# Patient Record
Sex: FEMALE | Race: WHITE | Employment: OTHER | URBAN - METROPOLITAN AREA
[De-identification: names, ages, dates, MRNs, and addresses within clinical notes are randomized per-mention and may not be internally consistent; named-entity substitution may affect disease eponyms.]

---

## 2017-01-03 ENCOUNTER — HOSPITAL ENCOUNTER (OUTPATIENT)
Dept: LAB | Facility: HOSPITAL | Age: 82
Discharge: HOME | End: 2017-01-03
Attending: NURSE PRACTITIONER

## 2017-01-03 ENCOUNTER — HOSPITAL ENCOUNTER (OUTPATIENT)
Dept: CARDIOLOGY CLINIC | Facility: HOSPITAL | Age: 82
Discharge: HOME | End: 2017-01-03
Attending: NURSE PRACTITIONER

## 2017-01-03 ENCOUNTER — HOSPITAL ENCOUNTER (OUTPATIENT)
Dept: CARDIOLOGY CLINIC | Facility: HOSPITAL | Age: 82
End: 2017-01-03

## 2017-01-03 VITALS
SYSTOLIC BLOOD PRESSURE: 157 MMHG | OXYGEN SATURATION: 95 % | HEART RATE: 74 BPM | DIASTOLIC BLOOD PRESSURE: 64 MMHG | BODY MASS INDEX: 37 KG/M2 | WEIGHT: 244.19 LBS | RESPIRATION RATE: 18 BRPM

## 2017-01-03 DIAGNOSIS — I50.9 CHF (CONGESTIVE HEART FAILURE) (HCC): ICD-10-CM

## 2017-01-03 LAB
BASOPHILS # BLD AUTO: 0.05 X10(3) UL (ref 0–0.1)
BASOPHILS NFR BLD AUTO: 0.5 %
BUN BLD-MCNC: 18 MG/DL (ref 8–20)
CALCIUM BLD-MCNC: 8.7 MG/DL (ref 8.3–10.3)
CHLORIDE: 107 MMOL/L (ref 101–111)
CO2: 26 MMOL/L (ref 22–32)
CREAT BLD-MCNC: 1.16 MG/DL (ref 0.55–1.02)
EOSINOPHIL # BLD AUTO: 0.14 X10(3) UL (ref 0–0.3)
EOSINOPHIL NFR BLD AUTO: 1.4 %
ERYTHROCYTE [DISTWIDTH] IN BLOOD BY AUTOMATED COUNT: 15.1 % (ref 11.5–16)
GLUCOSE BLD-MCNC: 92 MG/DL (ref 70–99)
HCT VFR BLD AUTO: 43.9 % (ref 34–50)
HGB BLD-MCNC: 14.3 G/DL (ref 12–16)
IMMATURE GRANULOCYTE COUNT: 0.03 X10(3) UL (ref 0–1)
IMMATURE GRANULOCYTE RATIO %: 0.3 %
LYMPHOCYTES # BLD AUTO: 2.3 X10(3) UL (ref 0.9–4)
LYMPHOCYTES NFR BLD AUTO: 23.2 %
MCH RBC QN AUTO: 28.9 PG (ref 27–33.2)
MCHC RBC AUTO-ENTMCNC: 32.6 G/DL (ref 31–37)
MCV RBC AUTO: 88.7 FL (ref 81–100)
MONOCYTES # BLD AUTO: 0.7 X10(3) UL (ref 0.1–0.6)
MONOCYTES NFR BLD AUTO: 7.1 %
NEUTROPHIL ABS PRELIM: 6.69 X10 (3) UL (ref 1.3–6.7)
NEUTROPHILS # BLD AUTO: 6.69 X10(3) UL (ref 1.3–6.7)
NEUTROPHILS NFR BLD AUTO: 67.5 %
PLATELET # BLD AUTO: 201 10(3)UL (ref 150–450)
POTASSIUM SERPL-SCNC: 4.5 MMOL/L (ref 3.6–5.1)
RBC # BLD AUTO: 4.95 X10(6)UL (ref 3.8–5.1)
RED CELL DISTRIBUTION WIDTH-SD: 49 FL (ref 35.1–46.3)
SODIUM SERPL-SCNC: 140 MMOL/L (ref 136–144)
WBC # BLD AUTO: 9.9 X10(3) UL (ref 4–13)

## 2017-01-03 PROCEDURE — 99214 OFFICE O/P EST MOD 30 MIN: CPT

## 2017-01-03 PROCEDURE — 80048 BASIC METABOLIC PNL TOTAL CA: CPT | Performed by: NURSE PRACTITIONER

## 2017-01-03 PROCEDURE — 85025 COMPLETE CBC W/AUTO DIFF WBC: CPT | Performed by: NURSE PRACTITIONER

## 2017-01-03 PROCEDURE — 36415 COLL VENOUS BLD VENIPUNCTURE: CPT | Performed by: NURSE PRACTITIONER

## 2017-01-03 RX ORDER — ISOSORBIDE MONONITRATE 30 MG/1
60 TABLET, EXTENDED RELEASE ORAL 2 TIMES DAILY
Qty: 360 TABLET | Refills: 3 | Status: SHIPPED | OUTPATIENT
Start: 2017-01-03 | End: 2017-03-03

## 2017-01-03 NOTE — PROGRESS NOTES
[facility] Cardiac Health Progress Note    Jada Mercedes is a 80year old female who presents to clinic for APN assessment and management of chronic systolic heart failure and is functional class 2-3.      Subjective:  She returns for rou x10(3) uL 0.03   Neutrophils % Latest Units: % 67.5   Lymphocytes % Latest Units: % 23.2   Monocytes % Latest Units: % 7.1   Eosinophils % Latest Units: % 1.4   Basophils % Latest Units: % 0.5   Immature Granulocyte % Latest Units: % 0.3   Results for JOSE (ALDACTONE) 25 MG Oral Tab, Take 0.5 tablets by mouth daily. , Disp: 30 tablet, Rfl: 5  •  aspirin  MG Oral Tab EC, Take 1 tablet by mouth daily. , Disp: 30 tablet, Rfl: 0    Exam:   General:         Alert, in no apparent distress  HEENT:          No J

## 2017-01-04 ENCOUNTER — TELEPHONE (OUTPATIENT)
Dept: FAMILY MEDICINE CLINIC | Facility: CLINIC | Age: 82
End: 2017-01-04

## 2017-01-04 NOTE — TELEPHONE ENCOUNTER
Requesting Isosorbide Mono ER 60mg  LOV: 9/29/16  RTC: 3-6 months  Last Labs: 1/3/17  Filled: 1/3/16 #360 with 3 refills  At 30mg 2 bid dosing    Future Appointments  Date Time Provider Caitlin Astorga   2/3/2017 2:00 PM HEART FAILURE APN 1  HF CLIN Ed

## 2017-01-04 NOTE — TELEPHONE ENCOUNTER
Pharmacist called and stated that they never rec'd script & would like to know if it can be resent.    Isosorbide Mononitrate ER 30 MG Oral Tablet 24 Hr 360 tablet 3 1/3/2017      Sig :  Take 2 tablets (60 mg total) by mouth 2 (two) times daily.       Route

## 2017-01-04 NOTE — TELEPHONE ENCOUNTER
This was not prescribed by our office:     Patient Information      Patient Name MRN Sex      Magali Sons [id number] Female 10/29/1934       Order Information      Ordered Status Priority Ordering User Department     17 Sent (none) VERNON De Luna

## 2017-01-04 NOTE — TELEPHONE ENCOUNTER
Valarie Gray was notified and will have patient see Jose Monet or yg if persists. She said her mother is going to be moved from that facility to another and she has paperwork she needs filled out. She is dropping off tomorrow. Patient is moving in on 1/14/17.  MARTINE randhawa

## 2017-01-04 NOTE — TELEPHONE ENCOUNTER
Ok to call patient's dtr lorna mccann  That her urine test from her assisted living [place] is normal  No urine infection.     So just notify and can notify on 12/30 they called saying burning with urination is not from infection- might be from someth

## 2017-01-05 ENCOUNTER — TELEPHONE (OUTPATIENT)
Dept: FAMILY MEDICINE CLINIC | Facility: CLINIC | Age: 82
End: 2017-01-05

## 2017-01-05 RX ORDER — ISOSORBIDE MONONITRATE 60 MG/1
TABLET, EXTENDED RELEASE ORAL
Qty: 180 TABLET | Refills: 3 | OUTPATIENT
Start: 2017-01-05 | End: 2017-04-17

## 2017-01-05 NOTE — TELEPHONE ENCOUNTER
Pt daughter is dropping of paperwork for her mother for assisting living. Pt filled out questionnaire for disability and FMLA forms for  In questions. Paperwork put in triage red folder.  Thank you

## 2017-01-12 NOTE — TELEPHONE ENCOUNTER
LM for daughter Aaron Perez that paperwork is filled out. Does she want to pick it up or is there a fax number that we should fax it to? Most recent OV notes and ED discharge summary report are also included. All papers are in red triage folder.   Copies alrea

## 2017-01-12 NOTE — TELEPHONE ENCOUNTER
Assisted living paperwork filled out. Placed in MD bin for review and/or signature. Please call daughter Omar Holloway when completed.

## 2017-01-16 NOTE — TELEPHONE ENCOUNTER
Per Ayden Tyler, PSR  Daughter Gonzalo Larson called back and states that they no longer need the Assisted Living paperwork because patient moved back home and they will look into home care.

## 2017-02-03 ENCOUNTER — HOSPITAL ENCOUNTER (OUTPATIENT)
Dept: CARDIOLOGY CLINIC | Facility: HOSPITAL | Age: 82
Discharge: HOME | End: 2017-02-03
Attending: NURSE PRACTITIONER
Payer: MEDICARE

## 2017-02-03 ENCOUNTER — HOSPITAL ENCOUNTER (OUTPATIENT)
Dept: LAB | Facility: HOSPITAL | Age: 82
Discharge: HOME | End: 2017-02-03
Attending: NURSE PRACTITIONER
Payer: MEDICARE

## 2017-02-03 VITALS
BODY MASS INDEX: 37 KG/M2 | WEIGHT: 240.13 LBS | RESPIRATION RATE: 18 BRPM | DIASTOLIC BLOOD PRESSURE: 66 MMHG | OXYGEN SATURATION: 99 % | SYSTOLIC BLOOD PRESSURE: 150 MMHG | HEART RATE: 70 BPM

## 2017-02-03 PROCEDURE — 99214 OFFICE O/P EST MOD 30 MIN: CPT | Performed by: NURSE PRACTITIONER

## 2017-02-03 PROCEDURE — 36415 COLL VENOUS BLD VENIPUNCTURE: CPT | Performed by: NURSE PRACTITIONER

## 2017-02-03 PROCEDURE — 80048 BASIC METABOLIC PNL TOTAL CA: CPT | Performed by: NURSE PRACTITIONER

## 2017-02-03 NOTE — PROGRESS NOTES
[facility] for Cardiac Health Progress Note    Peter Soraes is a 80year old female who presents to clinic for APN assessment and management of chronic systolic heart failure and is functional class 2-3.      Subjective:  She returns for rou 90 capsule, Rfl: 1  •  Potassium Chloride ER 20 MEQ Oral Tab CR, Take 1 tablet (20 mEq total) by mouth daily. , Disp: 180 tablet, Rfl: 3  •  carvedilol 12.5 MG Oral Tab, Take 12.5 mg by mouth 2 (two) times daily with meals. , Disp: , Rfl:   •  lisinopril 40 hemodynamically stable.    4. Essential HTN - home SBPs 110s-130s. Well controlled. SBPs in clinic slightly elevated in the 140s-150s. Instructed to bring in home BP cuff at next visit  5. CAROLINE - using BiPAP consistently  6.  Hx of Arthritis - pain is contro

## 2017-02-09 ENCOUNTER — TELEPHONE (OUTPATIENT)
Dept: FAMILY MEDICINE CLINIC | Facility: CLINIC | Age: 82
End: 2017-02-09

## 2017-03-03 ENCOUNTER — HOSPITAL ENCOUNTER (OUTPATIENT)
Dept: CARDIOLOGY CLINIC | Facility: HOSPITAL | Age: 82
Discharge: HOME | End: 2017-03-03
Attending: NURSE PRACTITIONER
Payer: MEDICARE

## 2017-03-03 ENCOUNTER — HOSPITAL ENCOUNTER (OUTPATIENT)
Dept: LAB | Facility: HOSPITAL | Age: 82
Discharge: HOME | End: 2017-03-03
Attending: NURSE PRACTITIONER
Payer: MEDICARE

## 2017-03-03 VITALS
OXYGEN SATURATION: 93 % | RESPIRATION RATE: 18 BRPM | HEART RATE: 65 BPM | BODY MASS INDEX: 37 KG/M2 | SYSTOLIC BLOOD PRESSURE: 130 MMHG | DIASTOLIC BLOOD PRESSURE: 62 MMHG | WEIGHT: 242.38 LBS

## 2017-03-03 DIAGNOSIS — I50.9 CHF (CONGESTIVE HEART FAILURE) (HCC): ICD-10-CM

## 2017-03-03 LAB
BUN BLD-MCNC: 18 MG/DL (ref 8–20)
CALCIUM BLD-MCNC: 9.3 MG/DL (ref 8.3–10.3)
CHLORIDE: 109 MMOL/L (ref 101–111)
CO2: 25 MMOL/L (ref 22–32)
CREAT BLD-MCNC: 1.03 MG/DL (ref 0.55–1.02)
GLUCOSE BLD-MCNC: 92 MG/DL (ref 70–99)
POTASSIUM SERPL-SCNC: 4.4 MMOL/L (ref 3.6–5.1)
SODIUM SERPL-SCNC: 140 MMOL/L (ref 136–144)

## 2017-03-03 PROCEDURE — 36415 COLL VENOUS BLD VENIPUNCTURE: CPT | Performed by: NURSE PRACTITIONER

## 2017-03-03 PROCEDURE — 80048 BASIC METABOLIC PNL TOTAL CA: CPT | Performed by: NURSE PRACTITIONER

## 2017-03-03 PROCEDURE — 99213 OFFICE O/P EST LOW 20 MIN: CPT

## 2017-03-03 NOTE — PROGRESS NOTES
[facility] for Cardiac Health Progress Note    Judi Loo is a 80year old female who presents to clinic for APN assessment and management of chronic systolic heart failure and is functional class 3.      Subjective:  She returns for [facility] Rfl:   •  Lovastatin 20 MG Oral Tab, Take 20 mg by mouth nightly., Disp: , Rfl:   •  TraMADol HCl 50 MG Oral Tab, Take 50 mg by mouth every 6 (six) hours as needed for Pain., Disp: , Rfl:   •  nitroGLYCERIN (NITROSTAT) 0.4 MG Sublingual SL Tab, Place 1 tab - discussed portion control  8. Depression - has followed with Dr Skye Mike  9. 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 GFR of 51 today.     Plan:   · Continue current medications  · Return to clinic in

## 2017-03-21 ENCOUNTER — TELEPHONE (OUTPATIENT)
Dept: FAMILY MEDICINE CLINIC | Facility: CLINIC | Age: 82
End: 2017-03-21

## 2017-04-04 RX ORDER — LOVASTATIN 20 MG/1
20 TABLET ORAL NIGHTLY
Refills: 0 | OUTPATIENT
Start: 2017-04-04

## 2017-04-04 RX ORDER — CARVEDILOL 12.5 MG/1
TABLET ORAL
Qty: 180 TABLET | Refills: 0 | OUTPATIENT
Start: 2017-04-04

## 2017-04-04 RX ORDER — CARVEDILOL 12.5 MG/1
12.5 TABLET ORAL 2 TIMES DAILY WITH MEALS
Qty: 180 TABLET | Refills: 0 | Status: SHIPPED | OUTPATIENT
Start: 2017-04-04 | End: 2017-06-21

## 2017-04-04 RX ORDER — SPIRONOLACTONE 25 MG/1
12.5 TABLET ORAL DAILY
Qty: 30 TABLET | Refills: 5 | OUTPATIENT
Start: 2017-04-04

## 2017-04-04 RX ORDER — SPIRONOLACTONE 25 MG/1
TABLET ORAL
Refills: 0 | OUTPATIENT
Start: 2017-04-04

## 2017-04-04 RX ORDER — LOVASTATIN 20 MG/1
TABLET ORAL
Refills: 0 | OUTPATIENT
Start: 2017-04-04

## 2017-04-04 NOTE — TELEPHONE ENCOUNTER
Cholesterol Medication Protocol Failed4/3 2:58 PM   Lipid panel within past 6 months     Requesting lovastatin, spironolactone and carvedilol   LOV: 9/2016  RTC: 6m   Last Labs: 8/2016  Filled: Lovastatin last filled by heart failure clinic   Spironolacton

## 2017-04-05 PROBLEM — E66.01 SEVERE OBESITY (BMI 35.0-39.9) WITH COMORBIDITY (HCC): Chronic | Status: ACTIVE | Noted: 2017-04-05

## 2017-04-05 RX ORDER — SPIRONOLACTONE 25 MG/1
TABLET ORAL
Qty: 45 TABLET | Refills: 1 | Status: SHIPPED | OUTPATIENT
Start: 2017-04-05 | End: 2017-09-15

## 2017-04-05 RX ORDER — LOVASTATIN 20 MG/1
TABLET ORAL
Qty: 90 TABLET | Refills: 1 | OUTPATIENT
Start: 2017-04-05

## 2017-04-05 NOTE — TELEPHONE ENCOUNTER
[facility] is calling to check on the status of the refill request for SPIRONOLACTONE 25 MG Oral Tab and     LOVASTATIN 20 MG Oral Tab Pharmacy stated that they never received the request and that the patient is out of her medication.  Please call p

## 2017-04-05 NOTE — TELEPHONE ENCOUNTER
Cholesterol Medication Protocol Failed4/5 9:20 AM   Lipid panel within past 6 months        Requesting Lovastatin 20mg   LOV: 09/29/16  RTC: 3-6 mos  Last Labs: 01/03/17 bmp, cbc  Filled: 7/25/16#90 with 3 refills- has not been prescribed by Dr Samantha Kumar last fi

## 2017-04-17 ENCOUNTER — HOSPITAL ENCOUNTER (OUTPATIENT)
Dept: LAB | Facility: HOSPITAL | Age: 82
Discharge: HOME | End: 2017-04-17
Attending: NURSE PRACTITIONER
Payer: MEDICARE

## 2017-04-17 ENCOUNTER — HOSPITAL ENCOUNTER (OUTPATIENT)
Dept: CARDIOLOGY CLINIC | Facility: HOSPITAL | Age: 82
Discharge: HOME | End: 2017-04-17
Attending: NURSE PRACTITIONER
Payer: MEDICARE

## 2017-04-17 VITALS
OXYGEN SATURATION: 98 % | WEIGHT: 245.31 LBS | SYSTOLIC BLOOD PRESSURE: 116 MMHG | RESPIRATION RATE: 18 BRPM | BODY MASS INDEX: 37 KG/M2 | DIASTOLIC BLOOD PRESSURE: 59 MMHG | HEART RATE: 64 BPM

## 2017-04-17 DIAGNOSIS — I50.22 CHRONIC SYSTOLIC CONGESTIVE HEART FAILURE (HCC): ICD-10-CM

## 2017-04-17 PROCEDURE — 80048 BASIC METABOLIC PNL TOTAL CA: CPT | Performed by: NURSE PRACTITIONER

## 2017-04-17 PROCEDURE — 99214 OFFICE O/P EST MOD 30 MIN: CPT

## 2017-04-17 PROCEDURE — 85027 COMPLETE CBC AUTOMATED: CPT | Performed by: NURSE PRACTITIONER

## 2017-04-17 PROCEDURE — 36415 COLL VENOUS BLD VENIPUNCTURE: CPT | Performed by: NURSE PRACTITIONER

## 2017-04-17 RX ORDER — ISOSORBIDE MONONITRATE 60 MG/1
TABLET, EXTENDED RELEASE ORAL
Qty: 90 TABLET | Refills: 6 | Status: SHIPPED | OUTPATIENT
Start: 2017-04-17

## 2017-04-17 RX ORDER — ISOSORBIDE MONONITRATE 60 MG/1
TABLET, EXTENDED RELEASE ORAL
Status: SHIPPED | COMMUNITY
Start: 2017-04-17 | End: 2017-04-17

## 2017-04-17 NOTE — PROGRESS NOTES
RN contacted pt to inform her of her appointment that was scheduled with Dr. Job Asencio this Friday (4/21) at 12:30pm. Pt will attend the appointment and will call the [facility] office if she needs to cancel.

## 2017-04-17 NOTE — ADDENDUM NOTE
Encounter addended by: OLGA Vance on: 4/17/2017 10:45 AM<BR>     Documentation filed: Medications, Orders

## 2017-04-17 NOTE — PROGRESS NOTES
[facility] for Cardiac Health Progress Note    Norma Wheeler is a 80year old female who presents to clinic for APN assessment and management of chronic systolic heart failure and is functional class 3.      Subjective:  She returns for [facility] Take 1 tablet (20 mEq total) by mouth daily. , Disp: 180 tablet, Rfl: 3  •  lisinopril 40 MG Oral Tab, Take 40 mg by mouth daily. , Disp: , Rfl:   •  Lovastatin 20 MG Oral Tab, Take 20 mg by mouth nightly., Disp: , Rfl:   •  TraMADol HCl 50 MG Oral Tab, Take increased frequency of chest pain. · Return to clinic in 4-6 weeks  · F/U with Dr. Gene Hernandez asap.   · CHF discharge instructions given    I spent greater than 45 minutes with this patient providing counseling, coordination of care and education related s

## 2017-04-17 NOTE — ADDENDUM NOTE
Encounter addended by: Malia Medina RN on: 4/17/2017 11:38 AM<BR>     Documentation filed: Clinical Notes

## 2017-04-21 ENCOUNTER — PRIOR ORIGINAL RECORDS (OUTPATIENT)
Dept: OTHER | Age: 82
End: 2017-04-21

## 2017-04-26 ENCOUNTER — TELEPHONE (OUTPATIENT)
Dept: FAMILY MEDICINE CLINIC | Facility: CLINIC | Age: 82
End: 2017-04-26

## 2017-04-26 PROBLEM — M47.812 CERVICAL ARTHRITIS: Status: ACTIVE | Noted: 2017-04-26

## 2017-04-26 RX ORDER — LOVASTATIN 20 MG/1
20 TABLET ORAL NIGHTLY
Qty: 90 TABLET | Refills: 1 | Status: SHIPPED | OUTPATIENT
Start: 2017-04-26 | End: 2017-10-09

## 2017-04-26 RX ORDER — OMEPRAZOLE 20 MG/1
20 CAPSULE, DELAYED RELEASE ORAL DAILY
Qty: 90 CAPSULE | Refills: 1 | Status: SHIPPED | OUTPATIENT
Start: 2017-04-26 | End: 2017-10-09

## 2017-04-26 NOTE — TELEPHONE ENCOUNTER
omeprazole 20 MG Oral Capsule Delayed Release  Gastrointestinal Medication Protocol Failed4/26 4:35 PM   Appointment in past 6 or next 1 month   Lovastatin 20 MG Oral Tab  Cholesterol Medication Protocol Failed4/26 4:35 PM   Lipid panel within past 6 month

## 2017-05-12 RX ORDER — CELECOXIB 200 MG/1
CAPSULE ORAL
Qty: 30 CAPSULE | Refills: 1 | Status: SHIPPED | OUTPATIENT
Start: 2017-05-12 | End: 2017-07-11

## 2017-05-12 NOTE — TELEPHONE ENCOUNTER
Katy john called and stated that she is requesting a refill on her prescription for celebrex. Patient is having a lot of hip pain and is having a hard time sleeping at night. Patient has an appointment with Dr. Cortes Telles on 05/26/2017.    Daughter states

## 2017-05-22 ENCOUNTER — HOSPITAL ENCOUNTER (OUTPATIENT)
Dept: CARDIOLOGY CLINIC | Facility: HOSPITAL | Age: 82
End: 2017-05-22
Attending: NURSE PRACTITIONER
Payer: MEDICARE

## 2017-05-25 RX ORDER — LISINOPRIL 40 MG/1
TABLET ORAL
Qty: 90 TABLET | Refills: 0 | Status: SHIPPED | OUTPATIENT
Start: 2017-05-25 | End: 2017-07-19

## 2017-05-25 RX ORDER — POTASSIUM CHLORIDE 20 MEQ/1
TABLET, EXTENDED RELEASE ORAL
Qty: 40 TABLET | Refills: 0 | Status: SHIPPED | OUTPATIENT
Start: 2017-05-25 | End: 2017-06-21

## 2017-05-25 RX ORDER — FUROSEMIDE 20 MG/1
TABLET ORAL
Qty: 20 TABLET | Refills: 0 | Status: SHIPPED | OUTPATIENT
Start: 2017-05-25 | End: 2017-06-21

## 2017-05-25 NOTE — TELEPHONE ENCOUNTER
Requesting Potassium Chloride, Furosemide, Lisinopril  LOV: 9/29/16  RTC: 3-6 months  Last Labs: 4/17/17  Filled: 9/29/15 #180 with 3 refills   Furosemide - not filled by us.  Given as inpatient 2016  Filled: 11/21/16 #90 with 1 refill     Lisinopril  Fille

## 2017-06-08 ENCOUNTER — HOSPITAL ENCOUNTER (OUTPATIENT)
Dept: LAB | Facility: HOSPITAL | Age: 82
Discharge: HOME | End: 2017-06-08
Attending: NURSE PRACTITIONER
Payer: MEDICARE

## 2017-06-08 ENCOUNTER — HOSPITAL ENCOUNTER (OUTPATIENT)
Dept: CARDIOLOGY CLINIC | Facility: HOSPITAL | Age: 82
Discharge: HOME | End: 2017-06-08
Attending: NURSE PRACTITIONER
Payer: MEDICARE

## 2017-06-08 VITALS
HEART RATE: 69 BPM | BODY MASS INDEX: 37 KG/M2 | DIASTOLIC BLOOD PRESSURE: 58 MMHG | RESPIRATION RATE: 18 BRPM | SYSTOLIC BLOOD PRESSURE: 102 MMHG | OXYGEN SATURATION: 97 % | WEIGHT: 244.63 LBS

## 2017-06-08 DIAGNOSIS — I50.22 CHRONIC SYSTOLIC CONGESTIVE HEART FAILURE (HCC): ICD-10-CM

## 2017-06-08 PROCEDURE — 36415 COLL VENOUS BLD VENIPUNCTURE: CPT | Performed by: NURSE PRACTITIONER

## 2017-06-08 PROCEDURE — 99214 OFFICE O/P EST MOD 30 MIN: CPT

## 2017-06-08 PROCEDURE — 80048 BASIC METABOLIC PNL TOTAL CA: CPT | Performed by: NURSE PRACTITIONER

## 2017-06-08 NOTE — PROGRESS NOTES
[facility] for Cardiac Health Progress Note    Jovana Rodriguez is a 80year old female who presents to clinic for APN assessment and management of chronic systolic heart failure and is functional class 3.      Subjective:  She returns for [facility] by mouth daily. , Disp: , Rfl:   •  TraMADol HCl 50 MG Oral Tab, Take 50 mg by mouth every 6 (six) hours as needed for Pain., Disp: , Rfl:   •  nitroGLYCERIN (NITROSTAT) 0.4 MG Sublingual SL Tab, Place 1 tablet (0.4 mg total) under the tongue every 5 (five) providing counseling, coordination of care and education related specifically to heart failure.     Cuate ESPINOZA  6/8/2017

## 2017-06-22 RX ORDER — FUROSEMIDE 20 MG/1
TABLET ORAL
Qty: 20 TABLET | Refills: 0 | Status: SHIPPED | OUTPATIENT
Start: 2017-06-22 | End: 2017-07-19

## 2017-06-22 RX ORDER — POTASSIUM CHLORIDE 20 MEQ/1
TABLET, EXTENDED RELEASE ORAL
Qty: 40 TABLET | Refills: 0 | Status: SHIPPED | OUTPATIENT
Start: 2017-06-22 | End: 2017-07-19

## 2017-06-22 RX ORDER — CARVEDILOL 12.5 MG/1
TABLET ORAL
Qty: 180 TABLET | Refills: 1 | Status: SHIPPED | OUTPATIENT
Start: 2017-06-22 | End: 2017-12-05

## 2017-06-22 NOTE — TELEPHONE ENCOUNTER
Requesting Furosemide, Potassium, Carvedilol  LOV: 9/29/16  RTC: 3-6 months  Last Labs: 6/8/17  Filled: 4/4/17 #180 with 0 refills  Carvedilol  Filled: 5/25/17 #40 with 0 refills Potassium  Filled: 5/25/17 #20 with 0 refills Furosemide    Future Appointmen

## 2017-06-29 ENCOUNTER — OFFICE VISIT (OUTPATIENT)
Dept: FAMILY MEDICINE CLINIC | Facility: CLINIC | Age: 82
End: 2017-06-29

## 2017-06-29 VITALS
BODY MASS INDEX: 36.83 KG/M2 | HEART RATE: 72 BPM | TEMPERATURE: 98 F | WEIGHT: 243 LBS | SYSTOLIC BLOOD PRESSURE: 126 MMHG | HEIGHT: 68 IN | DIASTOLIC BLOOD PRESSURE: 76 MMHG | RESPIRATION RATE: 18 BRPM

## 2017-06-29 DIAGNOSIS — I70.1 RENAL ARTERY STENOSIS (HCC): ICD-10-CM

## 2017-06-29 DIAGNOSIS — M16.11 ARTHRITIS OF RIGHT HIP: ICD-10-CM

## 2017-06-29 DIAGNOSIS — Z98.890 H/O: KNEE SURGERY: ICD-10-CM

## 2017-06-29 DIAGNOSIS — I34.0 MODERATE MITRAL REGURGITATION: ICD-10-CM

## 2017-06-29 DIAGNOSIS — R42 DIZZINESS: ICD-10-CM

## 2017-06-29 DIAGNOSIS — I73.9 PVD (PERIPHERAL VASCULAR DISEASE) (HCC): ICD-10-CM

## 2017-06-29 DIAGNOSIS — I77.1 SUBCLAVIAN ARTERIAL STENOSIS (HCC): ICD-10-CM

## 2017-06-29 DIAGNOSIS — E55.9 VITAMIN D DEFICIENCY: ICD-10-CM

## 2017-06-29 DIAGNOSIS — I44.7 LBBB (LEFT BUNDLE BRANCH BLOCK): ICD-10-CM

## 2017-06-29 DIAGNOSIS — I25.5 ISCHEMIC CARDIOMYOPATHY: ICD-10-CM

## 2017-06-29 DIAGNOSIS — M19.011 BILATERAL SHOULDER REGION ARTHRITIS: ICD-10-CM

## 2017-06-29 DIAGNOSIS — I25.119 CORONARY ARTERY DISEASE INVOLVING NATIVE CORONARY ARTERY OF NATIVE HEART WITH ANGINA PECTORIS (HCC): ICD-10-CM

## 2017-06-29 DIAGNOSIS — I50.22 CHRONIC SYSTOLIC CONGESTIVE HEART FAILURE (HCC): ICD-10-CM

## 2017-06-29 DIAGNOSIS — I20.8 ANGINA AT REST (HCC): ICD-10-CM

## 2017-06-29 DIAGNOSIS — E66.01 SEVERE OBESITY (BMI 35.0-39.9) WITH COMORBIDITY (HCC): Primary | Chronic | ICD-10-CM

## 2017-06-29 DIAGNOSIS — I77.1 ILIAC ARTERY STENOSIS, LEFT (HCC): ICD-10-CM

## 2017-06-29 DIAGNOSIS — M47.812 CERVICAL ARTHRITIS: ICD-10-CM

## 2017-06-29 DIAGNOSIS — I27.20 PULMONARY HTN (HCC): ICD-10-CM

## 2017-06-29 DIAGNOSIS — E53.8 VITAMIN B12 DEFICIENCY: ICD-10-CM

## 2017-06-29 DIAGNOSIS — Z99.89 BIPAP (BIPHASIC POSITIVE AIRWAY PRESSURE) DEPENDENCE: ICD-10-CM

## 2017-06-29 DIAGNOSIS — F33.0 DEPRESSION, MAJOR, RECURRENT, MILD (HCC): ICD-10-CM

## 2017-06-29 DIAGNOSIS — E78.5 DYSLIPIDEMIA: ICD-10-CM

## 2017-06-29 DIAGNOSIS — M15.9 PRIMARY OSTEOARTHRITIS INVOLVING MULTIPLE JOINTS: ICD-10-CM

## 2017-06-29 DIAGNOSIS — N18.30 CKD (CHRONIC KIDNEY DISEASE) STAGE 3, GFR 30-59 ML/MIN (HCC): ICD-10-CM

## 2017-06-29 DIAGNOSIS — I10 ESSENTIAL HYPERTENSION: ICD-10-CM

## 2017-06-29 DIAGNOSIS — M19.012 BILATERAL SHOULDER REGION ARTHRITIS: ICD-10-CM

## 2017-06-29 PROCEDURE — 96160 PT-FOCUSED HLTH RISK ASSMT: CPT | Performed by: INTERNAL MEDICINE

## 2017-06-29 PROCEDURE — 99397 PER PM REEVAL EST PAT 65+ YR: CPT | Performed by: INTERNAL MEDICINE

## 2017-06-29 RX ORDER — FLUOXETINE HYDROCHLORIDE 20 MG/1
20 CAPSULE ORAL DAILY
Qty: 30 CAPSULE | Refills: 0 | Status: SHIPPED | OUTPATIENT
Start: 2017-06-29 | End: 2017-06-29

## 2017-06-29 RX ORDER — FLUOXETINE HYDROCHLORIDE 20 MG/1
20 CAPSULE ORAL DAILY
Qty: 90 CAPSULE | Refills: 1 | Status: SHIPPED | OUTPATIENT
Start: 2017-06-29 | End: 2017-06-29

## 2017-06-29 RX ORDER — FLUOXETINE HYDROCHLORIDE 20 MG/1
20 CAPSULE ORAL DAILY
Qty: 90 CAPSULE | Refills: 1 | Status: SHIPPED | OUTPATIENT
Start: 2017-07-28 | End: 2017-11-29

## 2017-06-29 NOTE — PATIENT INSTRUCTIONS
Thank you for choosing Dario Chavez MD at [place] [address]  To Do: Soniya Eastman  1. Start prozac daily for mood- pickup [facility]  2. If it works we will script to [facility]- which will start next month  3.  Checkup in 2-3 months with dr Lai Rueda for [facility])  •[facility] call [phone] usually in [address] [facility] in Clinic in [place] at [facility].  [address] Mon-Fri at 8am-7:30pm, and Sat/Sun 9am-430pm  Also at [address]  Call [phone] for info    • Please call twice a year. Luiz Acosta

## 2017-06-29 NOTE — PROGRESS NOTES
Filipe Cuba is a 80year old female who presents for a MA Supervisit.     Health Maintenance Topics with due status: Overdue       Topic Date Due    Annual Physical 04/04/2017     Current Complaints:  Depression  Pt feels down in dumps  phq9 done  Used physical activity?: Light    How would you describe your current health state?: Poor    How do you maintain positive mental well-being?: Social Interaction;Puzzles;Games; Visiting Friends; Visiting Family    If you are a male age 38-65 or a female age 47-67, use see med list, or contraindicated because of a history of intolerance to using it.      Diet assessment: good     Advanced Directive:  Healthcare Power of  on file in [facility]:   See above discussed with patient and provided information      Living W Pelvic      Pap: Every 3 yrs age 21-68 or Pap+HPV every 5 yrs age 33-67, age 72 and older at high risk   There are no preventive care reminders to display for this patient.  Update Health Maintenance if applicable    Chlamydia  Annually if high risk No resu Value   08/03/2016 60    No flowsheet data found. Dilated Eye exam  Annually No flowsheet data found. No flowsheet data found. COPD      Spirometry Testing Annually No results found for this or any previous visit. No flowsheet data found.         AL Diagnosis Date   • Arthritis of hip     Right   • Arthritis of knee, degenerative    • Arthritis of right hip 7/2/2015   • CHF (congestive heart failure) ([facility])     ef 43   • Congestive heart disease ([facility] [address]    • Coronary artery disease with angina pectoris Negative for chest pain and palpitations. Gastrointestinal: Negative for nausea, vomiting, abdominal pain and diarrhea. Genitourinary: Negative for urgency, frequency and difficulty urinating.    Musculoskeletal: Negative for arthralgias and no gait pro Musculoskeletal: Normal range of motion. She exhibits no edema. Lymphadenopathy:    She has no cervical adenopathy or supraclavicular adenopathy. Neurological: She is alert and oriented to person, place, and time. She has normal reflexes.    Skin: Ski continue present management with observation and medications as noted           PVD (peripheral vascular disease) ([facility] [address]    Overview     L arm and renal artery and iliac L leg  chronic stable issue, continue present management with observation and medicatio present management with observation and medications as noted  Check labs         Vitamin D deficiency    Overview     chronic stable issue, continue present management with observation and medications as noted              Musculoskeletal    Osteoarthritis includes the following:  Annual Physical due on 04/04/2017  LDL Control due on 08/03/2017  Adult Pneumonia Vaccine(2 of 2 - PPSV23) due on 09/29/2017  Fall Risk Screening due on 10/26/2017  Influenza Vaccine Completed      Meds & Refills for this Visit:  S

## 2017-07-11 RX ORDER — CELECOXIB 200 MG/1
CAPSULE ORAL
Qty: 30 CAPSULE | Refills: 2 | Status: SHIPPED | OUTPATIENT
Start: 2017-07-11

## 2017-07-11 NOTE — TELEPHONE ENCOUNTER
Requesting Celecoxib 200mg daily  LOV: 6/29/17  RTC: 3 months  Last Labs: n/a  Filled: 5/12/17 #30 with 1 refills    Future Appointments  Date Time Provider Caitlin Astorga   8/3/2017 9:00 AM HEART FAILURE APN 1 [facility] HF CLIN [employer]   10/4/2017 10:30 A

## 2017-07-20 RX ORDER — POTASSIUM CHLORIDE 20 MEQ/1
TABLET, EXTENDED RELEASE ORAL
Qty: 90 TABLET | Refills: 3 | Status: SHIPPED | OUTPATIENT
Start: 2017-07-20

## 2017-07-20 RX ORDER — LISINOPRIL 40 MG/1
TABLET ORAL
Qty: 90 TABLET | Refills: 3 | Status: SHIPPED | OUTPATIENT
Start: 2017-07-20

## 2017-07-20 RX ORDER — FUROSEMIDE 20 MG/1
TABLET ORAL
Qty: 45 TABLET | Refills: 3 | Status: SHIPPED | OUTPATIENT
Start: 2017-07-20

## 2017-07-20 NOTE — TELEPHONE ENCOUNTER
Time started: 1124     Time ended: 1127     Total time spent on chart: 3 min    Requesting furosemide 20mg, potassium Er 20meq, lisinopril 40mg  LOV: 6/29/17  RTC: 3 months  Last Labs: 4/17/17  Filled: 6 22/17 # #20, # 40, with 0 refills  5/25/17 # 90 with

## 2017-07-28 RX ORDER — FLUOXETINE HYDROCHLORIDE 20 MG/1
CAPSULE ORAL
Qty: 30 CAPSULE | Refills: 0 | OUTPATIENT
Start: 2017-07-28

## 2017-08-17 ENCOUNTER — HOSPITAL ENCOUNTER (OUTPATIENT)
Dept: CARDIOLOGY CLINIC | Facility: HOSPITAL | Age: 82
End: 2017-08-17
Attending: NURSE PRACTITIONER
Payer: MEDICARE

## 2017-08-31 ENCOUNTER — HOSPITAL ENCOUNTER (OUTPATIENT)
Dept: LAB | Facility: HOSPITAL | Age: 82
Discharge: HOME | End: 2017-08-31
Attending: NURSE PRACTITIONER
Payer: MEDICARE

## 2017-08-31 ENCOUNTER — HOSPITAL ENCOUNTER (OUTPATIENT)
Dept: CARDIOLOGY CLINIC | Facility: HOSPITAL | Age: 82
Discharge: HOME | End: 2017-08-31
Attending: NURSE PRACTITIONER
Payer: MEDICARE

## 2017-08-31 VITALS
SYSTOLIC BLOOD PRESSURE: 107 MMHG | HEART RATE: 70 BPM | DIASTOLIC BLOOD PRESSURE: 53 MMHG | BODY MASS INDEX: 37 KG/M2 | WEIGHT: 243.13 LBS | RESPIRATION RATE: 22 BRPM | OXYGEN SATURATION: 96 %

## 2017-08-31 DIAGNOSIS — I50.9 CHF (CONGESTIVE HEART FAILURE) (HCC): ICD-10-CM

## 2017-08-31 DIAGNOSIS — I50.22 CHRONIC SYSTOLIC HF (HEART FAILURE) (HCC): Primary | ICD-10-CM

## 2017-08-31 LAB
BUN BLD-MCNC: 19 MG/DL (ref 8–20)
CALCIUM BLD-MCNC: 9.1 MG/DL (ref 8.3–10.3)
CHLORIDE: 106 MMOL/L (ref 101–111)
CO2: 27 MMOL/L (ref 22–32)
CREAT BLD-MCNC: 1.11 MG/DL (ref 0.55–1.02)
GLUCOSE BLD-MCNC: 97 MG/DL (ref 70–99)
POTASSIUM SERPL-SCNC: 4.8 MMOL/L (ref 3.6–5.1)
PRO-BETA NATRIURETIC PEPTIDE: 1319 PG/ML (ref ?–450)
SODIUM SERPL-SCNC: 137 MMOL/L (ref 136–144)

## 2017-08-31 PROCEDURE — 36415 COLL VENOUS BLD VENIPUNCTURE: CPT | Performed by: NURSE PRACTITIONER

## 2017-08-31 PROCEDURE — 83880 ASSAY OF NATRIURETIC PEPTIDE: CPT | Performed by: NURSE PRACTITIONER

## 2017-08-31 PROCEDURE — 80048 BASIC METABOLIC PNL TOTAL CA: CPT | Performed by: NURSE PRACTITIONER

## 2017-08-31 PROCEDURE — 99214 OFFICE O/P EST MOD 30 MIN: CPT

## 2017-08-31 NOTE — PROGRESS NOTES
[facility] for Cardiac Health Progress Note    Danelle Monday is a 80year old female who presents to clinic for APN assessment and management of chronic systolic heart failure and is functional class 3.      Subjective:  [facility] 12.5 MG Oral Tab, TAKE ONE TABLET BY MOUTH TWICE A DAY WITH MEALS, Disp: 180 tablet, Rfl: 1  •  omeprazole 20 MG Oral Capsule Delayed Release, Take 1 capsule (20 mg total) by mouth daily. , Disp: 90 capsule, Rfl: 1  •  Lovastatin 20 MG Oral Tab, medications and remains hemodynamically stable.    4. Essential HTN - SBP in the 120s, goal sbp 120-140 mmHg to avoid falls  5. CAROLINE - using BiPAP consistently  6. Hx of Arthritis - pain is controlled on tramadol  7.  Obesity - BMI of 37 - discussed portion

## 2017-09-13 ENCOUNTER — TELEPHONE (OUTPATIENT)
Dept: FAMILY MEDICINE CLINIC | Facility: CLINIC | Age: 82
End: 2017-09-13

## 2017-09-13 RX ORDER — LOVASTATIN 20 MG/1
TABLET ORAL
Qty: 90 TABLET | Refills: 0 | OUTPATIENT
Start: 2017-09-13

## 2017-09-13 RX ORDER — SPIRONOLACTONE 25 MG/1
TABLET ORAL
Qty: 45 TABLET | Refills: 0 | OUTPATIENT
Start: 2017-09-13

## 2017-09-13 NOTE — TELEPHONE ENCOUNTER
Requesting Spironolactone and Lovastatin  LOV: 6/29/17  RTC: 3 MONTHS  Last Labs: 6/8/17 BMP, Lipid 8/3/16  Filled: 4/26/17 #90 with 1 refills lovastatin  Filled: 4/5/17 #45 with 1 refill  Spironolactone    Both meds confirmed at pharmacy requesting - not

## 2017-09-15 RX ORDER — SPIRONOLACTONE 25 MG/1
TABLET ORAL
Qty: 45 TABLET | Refills: 0 | OUTPATIENT
Start: 2017-09-15 | End: 2017-12-05

## 2017-09-15 NOTE — TELEPHONE ENCOUNTER
Time started: 1007  Time ended: 1010  Total time spent on chart: 3min    Alexandra at pharmacy said they package her medicine every 28 days. She needs to get okay for spironolactone. It does pass protocol - refill given #45.     Every 28 days med is dispe

## 2017-09-15 NOTE — TELEPHONE ENCOUNTER
Pharmacy states there is not a refill on file, sts they package pts meds every 28 days. Pharmacy would like to speak with nurse.

## 2017-10-10 RX ORDER — OMEPRAZOLE 20 MG/1
CAPSULE, DELAYED RELEASE ORAL
Qty: 90 CAPSULE | Refills: 0 | Status: SHIPPED | OUTPATIENT
Start: 2017-10-10

## 2017-10-10 RX ORDER — LOVASTATIN 20 MG/1
TABLET ORAL
Qty: 90 TABLET | Refills: 0 | Status: SHIPPED | OUTPATIENT
Start: 2017-10-10

## 2017-10-10 NOTE — TELEPHONE ENCOUNTER
Requesting Omeprazole and Lovastatin  LOV: 6/29/17  RTC: 3 months  Last Labs: 8/3/16 lipid 8/15/16 cmp  Filled: 4/26/17 #90 with 1 refills both meds    Future Appointments  Date Time Provider Caitlin Astorga   10/31/2017 12:30 PM Anson Scott MD [facility] 20

## 2017-11-01 ENCOUNTER — APPOINTMENT (OUTPATIENT)
Dept: LAB | Age: 82
End: 2017-11-01
Attending: NURSE PRACTITIONER
Payer: MEDICARE

## 2017-11-01 ENCOUNTER — PRIOR ORIGINAL RECORDS (OUTPATIENT)
Dept: OTHER | Age: 82
End: 2017-11-01

## 2017-11-01 DIAGNOSIS — I50.22 CHRONIC SYSTOLIC HF (HEART FAILURE) (HCC): ICD-10-CM

## 2017-11-01 PROCEDURE — 36415 COLL VENOUS BLD VENIPUNCTURE: CPT

## 2017-11-01 PROCEDURE — 85027 COMPLETE CBC AUTOMATED: CPT

## 2017-11-02 ENCOUNTER — TELEPHONE (OUTPATIENT)
Dept: CARDIOLOGY CLINIC | Facility: HOSPITAL | Age: 82
End: 2017-11-02

## 2017-11-02 LAB
HEMATOCRIT: 42.7 %
HEMOGLOBIN: 13.4 G/DL
PLATELETS: 254 K/UL
RED BLOOD COUNT: 5.19 X 10-6/U
WHITE BLOOD COUNT: 9.9 X 10-3/U

## 2017-11-02 NOTE — PROGRESS NOTES
Labs reviewed. No changes. Results for Marjan Palmer (MRN [id number]) as of 11/2/2017 08:00   Ref.  Range 11/1/2017 07:36   WBC Latest Ref Range: 4.0 - 13.0 x10(3) uL 9.9   Hemoglobin Latest Ref Range: 12.0 - 16.0 g/dL 13.4   Hematocrit Latest Ref [facility]

## 2017-11-10 ENCOUNTER — MYAURORA ACCOUNT LINK (OUTPATIENT)
Dept: OTHER | Age: 82
End: 2017-11-10

## 2017-11-10 ENCOUNTER — PRIOR ORIGINAL RECORDS (OUTPATIENT)
Dept: OTHER | Age: 82
End: 2017-11-10

## 2017-11-29 ENCOUNTER — TELEPHONE (OUTPATIENT)
Dept: FAMILY MEDICINE CLINIC | Facility: CLINIC | Age: 82
End: 2017-11-29

## 2017-11-29 ENCOUNTER — OFFICE VISIT (OUTPATIENT)
Dept: FAMILY MEDICINE CLINIC | Facility: CLINIC | Age: 82
End: 2017-11-29

## 2017-11-29 VITALS
HEART RATE: 70 BPM | RESPIRATION RATE: 16 BRPM | BODY MASS INDEX: 36.68 KG/M2 | HEIGHT: 68 IN | WEIGHT: 242 LBS | DIASTOLIC BLOOD PRESSURE: 82 MMHG | SYSTOLIC BLOOD PRESSURE: 128 MMHG

## 2017-11-29 DIAGNOSIS — I25.119 CORONARY ARTERY DISEASE INVOLVING NATIVE CORONARY ARTERY OF NATIVE HEART WITH ANGINA PECTORIS (HCC): ICD-10-CM

## 2017-11-29 DIAGNOSIS — Z23 NEED FOR INFLUENZA VACCINATION: ICD-10-CM

## 2017-11-29 DIAGNOSIS — I10 ESSENTIAL HYPERTENSION: Primary | ICD-10-CM

## 2017-11-29 DIAGNOSIS — N18.30 CKD (CHRONIC KIDNEY DISEASE) STAGE 3, GFR 30-59 ML/MIN (HCC): ICD-10-CM

## 2017-11-29 DIAGNOSIS — E66.01 SEVERE OBESITY (BMI 35.0-39.9) WITH COMORBIDITY (HCC): Chronic | ICD-10-CM

## 2017-11-29 DIAGNOSIS — I50.22 CHRONIC SYSTOLIC CONGESTIVE HEART FAILURE (HCC): ICD-10-CM

## 2017-11-29 PROCEDURE — 90653 IIV ADJUVANT VACCINE IM: CPT | Performed by: INTERNAL MEDICINE

## 2017-11-29 PROCEDURE — 99214 OFFICE O/P EST MOD 30 MIN: CPT | Performed by: INTERNAL MEDICINE

## 2017-11-29 PROCEDURE — G0008 ADMIN INFLUENZA VIRUS VAC: HCPCS | Performed by: INTERNAL MEDICINE

## 2017-11-29 RX ORDER — FLUOXETINE HYDROCHLORIDE 20 MG/1
20 CAPSULE ORAL DAILY
Qty: 90 CAPSULE | Refills: 3 | Status: SHIPPED | OUTPATIENT
Start: 2017-11-29

## 2017-11-29 NOTE — TELEPHONE ENCOUNTER
[facility]   Address: [address], [place], [facility] [address]  [phone] ask for admissions    Spoke to Bridgett and informed of pt wanting to enroll in hospice, [place] will contact pt to provide more information.  If pt decides to enroll [facility]

## 2017-11-29 NOTE — TELEPHONE ENCOUNTER
She wants to talk to [facility] hospice, she doesn't want hospice but wants to talk to someone there if it's worth enrolling- she sleeps all day long everyday    Can we put [facility] hospice in touch with patient because of severe CHF

## 2017-11-29 NOTE — PROGRESS NOTES
[address] [facility] Office Note  Chief Complaint:   Patient presents with:  Medication Follow-Up: htn  CHF  CAD  Other: ckd      HPI:   This is a 80year old female coming in for  HPI  6 mo fu  1.  Pt here for fu  Pt says no complaints  S 1 CAPSULE BY MOUTH EVERY DAY Disp: 30 capsule Rfl: 2   CARVEDILOL 12.5 MG Oral Tab TAKE ONE TABLET BY MOUTH TWICE A DAY WITH MEALS Disp: 180 tablet Rfl: 1   Isosorbide Mononitrate ER 60 MG Oral Tablet 24 Hr 90 mg in the morning and 60 mg in the evening (Pa (primary encounter diagnosis)  Chronic systolic congestive heart failure (HCC)  Coronary artery disease involving native coronary artery of native heart with angina pectoris (HCC)  CKD (chronic kidney disease) stage 3, GFR 30-59 ml/min  Severe obesity (BMI we did this before and dtr wanted hospice that was 3 years ago pt didn't qualify for it, now again she wants it based on her mom just sleeps all day  I rec home health and social work to assess their needs    No orders of the defined types were placed in t (PHQ-2/PHQ-9): Over the LAST 2 WEEKS

## 2017-12-05 RX ORDER — CARVEDILOL 12.5 MG/1
TABLET ORAL
Qty: 180 TABLET | Refills: 1 | Status: SHIPPED | OUTPATIENT
Start: 2017-12-05

## 2017-12-05 RX ORDER — SPIRONOLACTONE 25 MG/1
TABLET ORAL
Qty: 45 TABLET | Refills: 1 | Status: SHIPPED | OUTPATIENT
Start: 2017-12-05

## 2017-12-05 NOTE — TELEPHONE ENCOUNTER
Requesting spironolactone 25mg, carvedilol 12.5mg  LOV: 11/29/17  RTC:   Last Relevant Labs: 6/8/17  Filled: 9/15/17 #45  with 0 refills            6/22/17  #180 with 1 refill   Future Appointments  Date Time Provider Caitlin Astorga   1/4/2018 9:00 AM H

## 2017-12-12 ENCOUNTER — MED REC SCAN ONLY (OUTPATIENT)
Dept: FAMILY MEDICINE CLINIC | Facility: CLINIC | Age: 82
End: 2017-12-12

## 2017-12-20 ENCOUNTER — PRIOR ORIGINAL RECORDS (OUTPATIENT)
Dept: OTHER | Age: 82
End: 2017-12-20

## 2018-01-02 ENCOUNTER — MED REC SCAN ONLY (OUTPATIENT)
Dept: FAMILY MEDICINE CLINIC | Facility: CLINIC | Age: 83
End: 2018-01-02

## 2018-01-04 ENCOUNTER — HOSPITAL ENCOUNTER (OUTPATIENT)
Dept: CARDIOLOGY CLINIC | Facility: HOSPITAL | Age: 83
End: 2018-01-04

## 2018-03-05 PROBLEM — Z99.81 SUPPLEMENTAL OXYGEN DEPENDENT: Status: ACTIVE | Noted: 2018-03-05

## 2018-03-07 PROBLEM — I50.42 CHRONIC COMBINED SYSTOLIC AND DIASTOLIC CONGESTIVE HEART FAILURE, NYHA CLASS 4 (HCC): Status: ACTIVE | Noted: 2018-03-07

## 2018-04-02 ENCOUNTER — NURSE ONLY (OUTPATIENT)
Dept: LAB | Age: 83
End: 2018-04-02
Attending: INTERNAL MEDICINE
Payer: MEDICARE

## 2018-04-02 DIAGNOSIS — R69 DIAGNOSIS UNKNOWN: Primary | ICD-10-CM

## 2018-04-02 PROCEDURE — 80053 COMPREHEN METABOLIC PANEL: CPT

## 2018-04-02 PROCEDURE — 85025 COMPLETE CBC W/AUTO DIFF WBC: CPT

## 2019-02-28 VITALS — HEART RATE: 72 BPM | DIASTOLIC BLOOD PRESSURE: 74 MMHG | SYSTOLIC BLOOD PRESSURE: 146 MMHG

## 2019-03-01 VITALS
BODY MASS INDEX: 36.53 KG/M2 | HEART RATE: 72 BPM | DIASTOLIC BLOOD PRESSURE: 70 MMHG | HEIGHT: 68 IN | WEIGHT: 241 LBS | SYSTOLIC BLOOD PRESSURE: 158 MMHG

## 2019-07-24 ENCOUNTER — APPOINTMENT (OUTPATIENT)
Dept: LAB | Age: 84
End: 2019-07-24
Attending: INTERNAL MEDICINE
Payer: MEDICARE

## 2019-07-24 PROCEDURE — 81001 URINALYSIS AUTO W/SCOPE: CPT

## 2019-07-24 PROCEDURE — 87086 URINE CULTURE/COLONY COUNT: CPT

## 2019-07-25 ENCOUNTER — NURSE ONLY (OUTPATIENT)
Dept: LAB | Age: 84
End: 2019-07-25
Attending: INTERNAL MEDICINE
Payer: MEDICARE

## 2019-07-25 DIAGNOSIS — N39.0 UTI (URINARY TRACT INFECTION): Primary | ICD-10-CM

## 2019-07-25 LAB
BILIRUB UR QL STRIP.AUTO: NEGATIVE
GLUCOSE UR STRIP.AUTO-MCNC: NEGATIVE MG/DL
HYALINE CASTS #/AREA URNS AUTO: PRESENT /LPF
KETONES UR STRIP.AUTO-MCNC: NEGATIVE MG/DL
NITRITE UR QL STRIP.AUTO: POSITIVE
PH UR STRIP.AUTO: 5 [PH] (ref 4.5–8)
PROT UR STRIP.AUTO-MCNC: NEGATIVE MG/DL
RBC UR QL AUTO: NEGATIVE
SP GR UR STRIP.AUTO: 1.01 (ref 1–1.03)
UROBILINOGEN UR STRIP.AUTO-MCNC: 2 MG/DL

## 2019-07-25 PROCEDURE — 87040 BLOOD CULTURE FOR BACTERIA: CPT

## 2019-07-26 NOTE — PROGRESS NOTES
May release ROUTINE AND SENSITIVE   Info to :   Name:  Monserrat Martinkayy   Relationship:  DAUGHTER  Phone number:  [phone]  [place], [phone]  May release SENSITIVE Info to:  Name:  Nadya Jo notified and verbalized [place]

## 2019-07-26 NOTE — PROGRESS NOTES
Please call to notify patient's daughter/NAYELI Hoyos Hopping that her cultures show no infection  [phone]    Thanks  Dr Elizabeth Aponte

## 2021-02-06 DIAGNOSIS — Z23 NEED FOR VACCINATION: ICD-10-CM

## (undated) NOTE — IP AVS SNAPSHOT
[facility] [place] [address] [place], [address] ~ [phone]                After Visit Summary   4/17/2017    Dora Emery    MRN: [id number]           Visit Information        Provider Department    4/17/2017  8:00 AM HEART FAIL We will make you an appt with Dr. Stanley Cisneros for 1-2 weeks. A nurse from the clinic will call you with your appointment time for Dr. Stanley Cisneros.      Activity: Regular exercise and activity is important for your overall health and to help keep your heart st · You get more tired with regular activity, or are limiting activity because of shortness of breath or fatigue  · You are short of breath lying down, you need more pillows to breathe comfortably,  or wake up during the night short of breath  · You urinate

## (undated) NOTE — IP AVS SNAPSHOT
[facility] [place] [address] [place] [address], [address] ~ [phone]                After Visit Summary   1/3/2017    Danelle Monday    MRN: [id number]           Visit Information        Provider Department    1/3/2017  2:00 PM HEART FAILUR week to check your pressure. Call the clinic with dizziness or lightheadedness. Activity: Regular exercise and activity is important for your overall health and to help keep your heart strong and functioning as well as possible.    Walk at a slow to mod because of shortness of breath or fatigue  · You are short of breath lying down, you need more pillows to breathe comfortably,  or wake up during the night short of breath  · You urinate less often during the day and more often at night  · You have a bloat

## (undated) NOTE — IP AVS SNAPSHOT
[facility] [place] [address] [place], [address] ~ [phone]                After Visit Summary   3/3/2017    Vivian Leach    MRN: [id number]           Visit Information        Provider Department    3/3/2017  8:00 AM HEART FAILUR Keep checking your blood pressure, goal is a top number of 120-140. Activity: Regular exercise and activity is important for your overall health and to help keep your heart strong and functioning as well as possible.    Walk at a slow pace with your wa because of shortness of breath or fatigue  · You are short of breath lying down, you need more pillows to breathe comfortably,  or wake up during the night short of breath  · You urinate less often during the day and more often at night  · You have a bloat

## (undated) NOTE — IP AVS SNAPSHOT
[facility] [place] [address] [place], [address] ~ [phone]                After Visit Summary   2/3/2017    Ivett Tran    MRN: [id number]           Visit Information        Provider Department    2/3/2017  2:45 PM HEART FAILUR Heart Failure Discharge Instructions      Activity: Regular exercise and activity is important for your overall health and to help keep your heart strong and functioning as well as possible.    Walk at a slow to moderate pace for 15 because of shortness of breath or fatigue  · You are short of breath lying down, you need more pillows to breathe comfortably,  or wake up during the night short of breath  · You urinate less often during the day and more often at night  · You have a bloat

## (undated) NOTE — IP AVS SNAPSHOT
[facility] [place] [address] [place], [address] ~ [phone]                After Visit Summary   6/8/2017    Kyrie Monk    MRN: [id number]           Visit Information        Provider Department    6/8/2017  8:00 AM HEART FAILUR We will call you with your lab results. Call the clinic if you have shortness of breath or dizziness. Activity: Regular exercise and activity is important for your overall health and to help keep your heart strong and functioning as well as possible. · You get more tired with regular activity, or are limiting activity because of shortness of breath or fatigue  · You are short of breath lying down, you need more pillows to breathe comfortably,  or wake up during the night short of breath  · You urinate